# Patient Record
Sex: FEMALE | Race: WHITE | NOT HISPANIC OR LATINO | URBAN - METROPOLITAN AREA
[De-identification: names, ages, dates, MRNs, and addresses within clinical notes are randomized per-mention and may not be internally consistent; named-entity substitution may affect disease eponyms.]

---

## 2024-09-25 ENCOUNTER — OUTPATIENT (OUTPATIENT)
Dept: OUTPATIENT SERVICES | Facility: HOSPITAL | Age: 64
LOS: 1 days | End: 2024-09-25
Payer: COMMERCIAL

## 2024-09-25 ENCOUNTER — RESULT REVIEW (OUTPATIENT)
Age: 64
End: 2024-09-25

## 2024-09-25 ENCOUNTER — NON-APPOINTMENT (OUTPATIENT)
Age: 64
End: 2024-09-25

## 2024-09-25 ENCOUNTER — APPOINTMENT (OUTPATIENT)
Dept: ORTHOPEDIC SURGERY | Facility: CLINIC | Age: 64
End: 2024-09-25
Payer: COMMERCIAL

## 2024-09-25 VITALS
HEIGHT: 66 IN | OXYGEN SATURATION: 98 % | BODY MASS INDEX: 23.3 KG/M2 | SYSTOLIC BLOOD PRESSURE: 161 MMHG | HEART RATE: 92 BPM | DIASTOLIC BLOOD PRESSURE: 89 MMHG | WEIGHT: 145 LBS

## 2024-09-25 DIAGNOSIS — M75.42 IMPINGEMENT SYNDROME OF LEFT SHOULDER: ICD-10-CM

## 2024-09-25 DIAGNOSIS — M19.90 UNSPECIFIED OSTEOARTHRITIS, UNSPECIFIED SITE: ICD-10-CM

## 2024-09-25 DIAGNOSIS — M67.814 OTHER SPECIFIED DISORDERS OF TENDON, LEFT SHOULDER: ICD-10-CM

## 2024-09-25 DIAGNOSIS — M67.912 UNSPECIFIED DISORDER OF SYNOVIUM AND TENDON, LEFT SHOULDER: ICD-10-CM

## 2024-09-25 PROCEDURE — 73030 X-RAY EXAM OF SHOULDER: CPT

## 2024-09-25 PROCEDURE — 99204 OFFICE O/P NEW MOD 45 MIN: CPT

## 2024-09-25 PROCEDURE — 73030 X-RAY EXAM OF SHOULDER: CPT | Mod: RT

## 2024-09-25 PROCEDURE — 73030 X-RAY EXAM OF SHOULDER: CPT | Mod: 26,LT,76

## 2024-09-25 RX ORDER — NAPROXEN 250 MG/1
250 TABLET ORAL
Qty: 30 | Refills: 1 | Status: ACTIVE | COMMUNITY
Start: 2024-09-25 | End: 1900-01-01

## 2024-09-25 RX ORDER — CYCLOBENZAPRINE HYDROCHLORIDE 5 MG/1
5 TABLET, FILM COATED ORAL
Qty: 30 | Refills: 0 | Status: ACTIVE | COMMUNITY
Start: 2024-09-25 | End: 1900-01-01

## 2024-09-26 NOTE — ADDENDUM
[FreeTextEntry1] : I, Lela Smith (Formerly [facility]) assisted in filling out this chart under the dictation of Sandra Mason on 09/25/2024 .

## 2024-09-26 NOTE — ADDENDUM
[FreeTextEntry1] : I, Lela Smith (Formerly [facility]) assisted in filling out this chart under the dictation of Sandra Mason on 09/25/2024 .

## 2024-09-26 NOTE — HISTORY OF PRESENT ILLNESS
[de-identified] : YOLI VASQUES is a 64-year-old female with left shoulder pain that began two years ago, similar to a previous episode of frozen shoulder experienced in either 2012 or 2014.  RHD Pain is off and on 2 years and in 2 months worsened. She attended PT in the past, states it helped. The patient describes the pain as sharp with associated clicking and tenderness over the upper trapezius. The patient mentions experiencing generalized body pain after receiving the Shingrix vaccine a month ago, followed by a COVID-19 infection five days later. The patient also reports having arthritis in the foot. The patient notes that blood pressure has been slightly elevated since the onset of shoulder pain. The patient has a blood pressure cuff at home for monitoring. She tried home exercises, Advil  Pain worsens when moving the shoulder cracking. She denies any numbness or tingling. Currently not working. She walks for exercise and tried pickleball but did not stay playing for long. Received US-guided injection in the past with relief.

## 2024-09-26 NOTE — ASSESSMENT
[FreeTextEntry1] : YOLI VASQUES is a 64-year-old female with left shoulder pain. I discussed with the patient that their symptoms, signs, and imaging are most consistent with rotator cuff tendinopathy, biceps tendinosis, and subacromial impingement. We reviewed the natural history of this condition and treatment options. We agreed on the following plan:    XR taken and reviewed with patient today. Activity modification Start Home Exercises for shoulder stretching and strengthening (Neer protocol). Demonstration, resistance band and handout provided. Start physical therapy. Referral provided. Medication: Naproxen 375 mg BID PRN and Cyclobenzaprine 5 to 10 mg HS PRN prescriptions provided. BP slightly elevated today; patient advised to home monitor. If remains elevated, avoid NSAIDs and stop taking naproxen; take acetaminophen 1 gram TID PRN instead. Advised to follow up with PCP for blood pressure monitoring and management. Low sodium diet advised. Recommend 150 minutes per week of moderate-intensity aerobic exercise with two days of resistance training. Advanced imaging: consider MRI if no symptomatic improvement. Follow up in 6-8 weeks.

## 2024-09-26 NOTE — DISCUSSION/SUMMARY

## 2024-09-26 NOTE — HISTORY OF PRESENT ILLNESS
[de-identified] : YOLI VASQUES is a 64-year-old female with left shoulder pain that began two years ago, similar to a previous episode of frozen shoulder experienced in either 2012 or 2014.  RHD Pain is off and on 2 years and in 2 months worsened. She attended PT in the past, states it helped. The patient describes the pain as sharp with associated clicking and tenderness over the upper trapezius. The patient mentions experiencing generalized body pain after receiving the Shingrix vaccine a month ago, followed by a COVID-19 infection five days later. The patient also reports having arthritis in the foot. The patient notes that blood pressure has been slightly elevated since the onset of shoulder pain. The patient has a blood pressure cuff at home for monitoring. She tried home exercises, Advil  Pain worsens when moving the shoulder cracking. She denies any numbness or tingling. Currently not working. She walks for exercise and tried pickleball but did not stay playing for long. Received US-guided injection in the past with relief.

## 2024-09-26 NOTE — HISTORY OF PRESENT ILLNESS
[de-identified] : YOLI VASQUES is a 64-year-old female with left shoulder pain that began two years ago, similar to a previous episode of frozen shoulder experienced in either 2012 or 2014.  RHD Pain is off and on 2 years and in 2 months worsened. She attended PT in the past, states it helped. The patient describes the pain as sharp with associated clicking and tenderness over the upper trapezius. The patient mentions experiencing generalized body pain after receiving the Shingrix vaccine a month ago, followed by a COVID-19 infection five days later. The patient also reports having arthritis in the foot. The patient notes that blood pressure has been slightly elevated since the onset of shoulder pain. The patient has a blood pressure cuff at home for monitoring. She tried home exercises, Advil  Pain worsens when moving the shoulder cracking. She denies any numbness or tingling. Currently not working. She walks for exercise and tried pickleball but did not stay playing for long. Received US-guided injection in the past with relief.

## 2024-09-26 NOTE — PHYSICAL EXAM
[de-identified] : General: Well-nourished, well-developed, alert, and in no acute distress.  Head: Normocephalic.  Eyes: Pupils equal, extraocular muscles intact, normal sclera.  Nose: No nasal discharge.  Cardiovascular: Extremities are warm and well perfused. Distal pulses are symmetric bilaterally.  Respiratory: No labored breathing.  Extremities: Sensation is intact distally bilaterally. Distal pulses are symmetric bilaterally  Lymphatic: No regional lymphadenopathy, no lymphedema  Neurologic: No focal deficits  Skin: Normal skin color, texture, and turgor  Psychiatric: Normal affect MSK:   C-spine demonstrates normal lordosis, no tenderness to palpation midline, paraspinals, AROM full and pain free Cervical facet loading negative Spurling's Compression negative   Examination of left shoulder shows no overlying skin changes or muscular atrophy. There is no tenderness to palpation over the AC joint, Bicipital groove. There is tenderness over the upper Trapezius   Range of motion shows forward elevation of [150], abduction [120], external rotation [50], and internal rotation to the [lumbar spine].  There is pain at the end range of motion.   Special Tests: Painful Arc [positive] Neer's negative Hawkin's [positive] Perico's positive Resisted ext rotation positive Lift-Off [positive] Harvey positive Speed's positive   Examination of right shoulder shows no overlying skin changes or muscular atrophy. There is no tenderness to palpation over the AC joint, Bicipital groove, Trapezius   Range of motion shows forward elevation of [160], abduction [160], external rotation [60], and internal rotation to the [lumbar spine].  There is no pain at the end range of motion.   Special Tests: Painful Arc negative Neer's negative Hawkin's negative Perico's negative Resisted ext rotation negative Lift-Off negative Harvey negative Speed's negative   Sensation is intact to light touch over the axillary, musculocutaneous, median, radial, and ulnar nerve distributions bilaterally. Capillary refill is less than two seconds. Radial pulses 2+ equal bilaterally. Strength testing shows 5/5 abduction, 5/5 external rotation, 5/5 internal rotation, 5/5 biceps, 5/5 triceps. 5/5 wrist extension, 5/5 intrinsics. Reflexes 2+ biceps, brachioradialis. Stevenson negative.  [de-identified] : Date: 09/25/2024 Location: [facility] Body part: XRAY B/L SHOULDER independently reviewed and interpreted by me. Impression: No evidence of fracture or dislocation. Mild glenohumeral joint space narrowing with humeral head osteophytosis on the left. Mild acromioclavicular joint arthrosis bilaterally.

## 2024-09-26 NOTE — PHYSICAL EXAM
[de-identified] : General: Well-nourished, well-developed, alert, and in no acute distress.  Head: Normocephalic.  Eyes: Pupils equal, extraocular muscles intact, normal sclera.  Nose: No nasal discharge.  Cardiovascular: Extremities are warm and well perfused. Distal pulses are symmetric bilaterally.  Respiratory: No labored breathing.  Extremities: Sensation is intact distally bilaterally. Distal pulses are symmetric bilaterally  Lymphatic: No regional lymphadenopathy, no lymphedema  Neurologic: No focal deficits  Skin: Normal skin color, texture, and turgor  Psychiatric: Normal affect MSK:   C-spine demonstrates normal lordosis, no tenderness to palpation midline, paraspinals, AROM full and pain free Cervical facet loading negative Spurling's Compression negative   Examination of left shoulder shows no overlying skin changes or muscular atrophy. There is no tenderness to palpation over the AC joint, Bicipital groove. There is tenderness over the upper Trapezius   Range of motion shows forward elevation of [150], abduction [120], external rotation [50], and internal rotation to the [lumbar spine].  There is pain at the end range of motion.   Special Tests: Painful Arc [positive] Neer's negative Hawkin's [positive] Perico's positive Resisted ext rotation positive Lift-Off [positive] Jefferson Davis positive Speed's positive   Examination of right shoulder shows no overlying skin changes or muscular atrophy. There is no tenderness to palpation over the AC joint, Bicipital groove, Trapezius   Range of motion shows forward elevation of [160], abduction [160], external rotation [60], and internal rotation to the [lumbar spine].  There is no pain at the end range of motion.   Special Tests: Painful Arc negative Neer's negative Hawkin's negative Perico's negative Resisted ext rotation negative Lift-Off negative Jefferson Davis negative Speed's negative   Sensation is intact to light touch over the axillary, musculocutaneous, median, radial, and ulnar nerve distributions bilaterally. Capillary refill is less than two seconds. Radial pulses 2+ equal bilaterally. Strength testing shows 5/5 abduction, 5/5 external rotation, 5/5 internal rotation, 5/5 biceps, 5/5 triceps. 5/5 wrist extension, 5/5 intrinsics. Reflexes 2+ biceps, brachioradialis. Stevenson negative.  [de-identified] : Date: 09/25/2024 Location: [facility] Body part: XRAY B/L SHOULDER independently reviewed and interpreted by me. Impression: No evidence of fracture or dislocation. Mild glenohumeral joint space narrowing with humeral head osteophytosis on the left. Mild acromioclavicular joint arthrosis bilaterally.

## 2024-09-26 NOTE — DISCUSSION/SUMMARY

## 2024-09-26 NOTE — PHYSICAL EXAM
[de-identified] : General: Well-nourished, well-developed, alert, and in no acute distress.  Head: Normocephalic.  Eyes: Pupils equal, extraocular muscles intact, normal sclera.  Nose: No nasal discharge.  Cardiovascular: Extremities are warm and well perfused. Distal pulses are symmetric bilaterally.  Respiratory: No labored breathing.  Extremities: Sensation is intact distally bilaterally. Distal pulses are symmetric bilaterally  Lymphatic: No regional lymphadenopathy, no lymphedema  Neurologic: No focal deficits  Skin: Normal skin color, texture, and turgor  Psychiatric: Normal affect MSK:   C-spine demonstrates normal lordosis, no tenderness to palpation midline, paraspinals, AROM full and pain free Cervical facet loading negative Spurling's Compression negative   Examination of left shoulder shows no overlying skin changes or muscular atrophy. There is no tenderness to palpation over the AC joint, Bicipital groove. There is tenderness over the upper Trapezius   Range of motion shows forward elevation of [150], abduction [120], external rotation [50], and internal rotation to the [lumbar spine].  There is pain at the end range of motion.   Special Tests: Painful Arc [positive] Neer's negative Hawkin's [positive] Perico's positive Resisted ext rotation positive Lift-Off [positive] Finney positive Speed's positive   Examination of right shoulder shows no overlying skin changes or muscular atrophy. There is no tenderness to palpation over the AC joint, Bicipital groove, Trapezius   Range of motion shows forward elevation of [160], abduction [160], external rotation [60], and internal rotation to the [lumbar spine].  There is no pain at the end range of motion.   Special Tests: Painful Arc negative Neer's negative Hawkin's negative Perico's negative Resisted ext rotation negative Lift-Off negative Finney negative Speed's negative   Sensation is intact to light touch over the axillary, musculocutaneous, median, radial, and ulnar nerve distributions bilaterally. Capillary refill is less than two seconds. Radial pulses 2+ equal bilaterally. Strength testing shows 5/5 abduction, 5/5 external rotation, 5/5 internal rotation, 5/5 biceps, 5/5 triceps. 5/5 wrist extension, 5/5 intrinsics. Reflexes 2+ biceps, brachioradialis. Stevenson negative.  [de-identified] : Date: 09/25/2024 Location: [facility] Body part: XRAY B/L SHOULDER independently reviewed and interpreted by me. Impression: No evidence of fracture or dislocation. Mild glenohumeral joint space narrowing with humeral head osteophytosis on the left. Mild acromioclavicular joint arthrosis bilaterally.

## 2024-09-26 NOTE — END OF VISIT
[FreeTextEntry3] : Documented by Lela Smith acting as a scribe for Sandra Mason on 09/25/2024   All medical record entries made by the Scribe were at my, Dr. Sandra Mason direction and personally dictated by me on 09/25/2024 . I have reviewed the chart and agree that the record accurately reflects my personal performance of the history, physical exam, assessment and plan. I have also personally directed, reviewed, and agreed with the chart. [Time Spent: ___ minutes] : I have spent [unfilled] minutes of time on the encounter which excludes teaching and separately reported services.

## 2024-09-26 NOTE — DISCUSSION/SUMMARY

## 2024-09-26 NOTE — ADDENDUM
[FreeTextEntry1] : I, Lela Smith ([facility]) assisted in filling out this chart under the dictation of Sandra Mason on 09/25/2024 .

## 2024-11-06 ENCOUNTER — APPOINTMENT (OUTPATIENT)
Dept: ORTHOPEDIC SURGERY | Facility: CLINIC | Age: 64
End: 2024-11-06
Payer: COMMERCIAL

## 2024-11-06 VITALS — OXYGEN SATURATION: 99 % | DIASTOLIC BLOOD PRESSURE: 83 MMHG | HEART RATE: 103 BPM | SYSTOLIC BLOOD PRESSURE: 146 MMHG

## 2024-11-06 DIAGNOSIS — M25.552 PAIN IN LEFT HIP: ICD-10-CM

## 2024-11-06 DIAGNOSIS — M75.42 IMPINGEMENT SYNDROME OF LEFT SHOULDER: ICD-10-CM

## 2024-11-06 DIAGNOSIS — M67.814 OTHER SPECIFIED DISORDERS OF TENDON, LEFT SHOULDER: ICD-10-CM

## 2024-11-06 DIAGNOSIS — M67.912 UNSPECIFIED DISORDER OF SYNOVIUM AND TENDON, LEFT SHOULDER: ICD-10-CM

## 2024-11-06 PROCEDURE — 99214 OFFICE O/P EST MOD 30 MIN: CPT

## 2024-11-06 RX ORDER — DICLOFENAC SODIUM 10 MG/G
1 GEL TOPICAL
Qty: 1 | Refills: 0 | Status: ACTIVE | COMMUNITY
Start: 2024-11-06 | End: 1900-01-01

## 2024-11-08 PROBLEM — M25.552 GREATER TROCHANTERIC PAIN SYNDROME OF LEFT LOWER EXTREMITY: Status: ACTIVE | Noted: 2024-11-08
